# Patient Record
Sex: FEMALE | Race: BLACK OR AFRICAN AMERICAN | NOT HISPANIC OR LATINO | URBAN - METROPOLITAN AREA
[De-identification: names, ages, dates, MRNs, and addresses within clinical notes are randomized per-mention and may not be internally consistent; named-entity substitution may affect disease eponyms.]

---

## 2017-04-01 ENCOUNTER — OUTPATIENT (OUTPATIENT)
Dept: OUTPATIENT SERVICES | Facility: HOSPITAL | Age: 47
LOS: 1 days | End: 2017-04-01
Payer: MEDICAID

## 2017-04-01 DIAGNOSIS — Z98.89 OTHER SPECIFIED POSTPROCEDURAL STATES: Chronic | ICD-10-CM

## 2017-04-01 DIAGNOSIS — Z90.49 ACQUIRED ABSENCE OF OTHER SPECIFIED PARTS OF DIGESTIVE TRACT: Chronic | ICD-10-CM

## 2017-04-11 DIAGNOSIS — R69 ILLNESS, UNSPECIFIED: ICD-10-CM

## 2017-04-17 ENCOUNTER — OUTPATIENT (OUTPATIENT)
Dept: OUTPATIENT SERVICES | Facility: HOSPITAL | Age: 47
LOS: 1 days | End: 2017-04-17

## 2017-04-17 DIAGNOSIS — Z98.89 OTHER SPECIFIED POSTPROCEDURAL STATES: Chronic | ICD-10-CM

## 2017-04-17 DIAGNOSIS — Z90.49 ACQUIRED ABSENCE OF OTHER SPECIFIED PARTS OF DIGESTIVE TRACT: Chronic | ICD-10-CM

## 2017-06-01 PROCEDURE — G9001: CPT

## 2019-05-30 ENCOUNTER — EMERGENCY (EMERGENCY)
Facility: HOSPITAL | Age: 49
LOS: 1 days | Discharge: HOME | End: 2019-05-30
Attending: EMERGENCY MEDICINE
Payer: MEDICAID

## 2019-05-30 VITALS
DIASTOLIC BLOOD PRESSURE: 88 MMHG | SYSTOLIC BLOOD PRESSURE: 145 MMHG | HEART RATE: 95 BPM | OXYGEN SATURATION: 99 % | RESPIRATION RATE: 20 BRPM | TEMPERATURE: 98 F

## 2019-05-30 DIAGNOSIS — Z98.89 OTHER SPECIFIED POSTPROCEDURAL STATES: Chronic | ICD-10-CM

## 2019-05-30 DIAGNOSIS — Z90.49 ACQUIRED ABSENCE OF OTHER SPECIFIED PARTS OF DIGESTIVE TRACT: Chronic | ICD-10-CM

## 2019-05-30 LAB
ANION GAP SERPL CALC-SCNC: 13 MMOL/L — SIGNIFICANT CHANGE UP (ref 5–17)
APAP SERPL-MCNC: <15 UG/ML — SIGNIFICANT CHANGE UP (ref 10–30)
APPEARANCE UR: CLEAR — SIGNIFICANT CHANGE UP
BASOPHILS # BLD AUTO: 0.1 K/UL — SIGNIFICANT CHANGE UP (ref 0–0.2)
BASOPHILS NFR BLD AUTO: 1.2 % — SIGNIFICANT CHANGE UP (ref 0–2)
BILIRUB UR-MCNC: NEGATIVE — SIGNIFICANT CHANGE UP
BUN SERPL-MCNC: 8 MG/DL — SIGNIFICANT CHANGE UP (ref 7–23)
CALCIUM SERPL-MCNC: 9.4 MG/DL — SIGNIFICANT CHANGE UP (ref 8.4–10.5)
CHLORIDE SERPL-SCNC: 101 MMOL/L — SIGNIFICANT CHANGE UP (ref 96–108)
CO2 SERPL-SCNC: 26 MMOL/L — SIGNIFICANT CHANGE UP (ref 22–31)
COLOR SPEC: SIGNIFICANT CHANGE UP
CREAT SERPL-MCNC: 0.65 MG/DL — SIGNIFICANT CHANGE UP (ref 0.5–1.3)
DIFF PNL FLD: NEGATIVE — SIGNIFICANT CHANGE UP
EOSINOPHIL # BLD AUTO: 0.2 K/UL — SIGNIFICANT CHANGE UP (ref 0–0.5)
EOSINOPHIL NFR BLD AUTO: 3.3 % — SIGNIFICANT CHANGE UP (ref 0–6)
ETHANOL SERPL-MCNC: SIGNIFICANT CHANGE UP MG/DL (ref 0–10)
GLUCOSE SERPL-MCNC: 132 MG/DL — HIGH (ref 70–99)
GLUCOSE UR QL: NEGATIVE — SIGNIFICANT CHANGE UP
HCT VFR BLD CALC: 40.1 % — SIGNIFICANT CHANGE UP (ref 34.5–45)
HGB BLD-MCNC: 13.4 G/DL — SIGNIFICANT CHANGE UP (ref 11.5–15.5)
KETONES UR-MCNC: NEGATIVE — SIGNIFICANT CHANGE UP
LEUKOCYTE ESTERASE UR-ACNC: NEGATIVE — SIGNIFICANT CHANGE UP
LYMPHOCYTES # BLD AUTO: 2.7 K/UL — SIGNIFICANT CHANGE UP (ref 1–3.3)
LYMPHOCYTES # BLD AUTO: 40.8 % — SIGNIFICANT CHANGE UP (ref 13–44)
MCHC RBC-ENTMCNC: 30.8 PG — SIGNIFICANT CHANGE UP (ref 27–34)
MCHC RBC-ENTMCNC: 33.3 GM/DL — SIGNIFICANT CHANGE UP (ref 32–36)
MCV RBC AUTO: 92.3 FL — SIGNIFICANT CHANGE UP (ref 80–100)
MONOCYTES # BLD AUTO: 0.4 K/UL — SIGNIFICANT CHANGE UP (ref 0–0.9)
MONOCYTES NFR BLD AUTO: 5.6 % — SIGNIFICANT CHANGE UP (ref 2–14)
NEUTROPHILS # BLD AUTO: 3.3 K/UL — SIGNIFICANT CHANGE UP (ref 1.8–7.4)
NEUTROPHILS NFR BLD AUTO: 49.1 % — SIGNIFICANT CHANGE UP (ref 43–77)
NITRITE UR-MCNC: NEGATIVE — SIGNIFICANT CHANGE UP
PCP SPEC-MCNC: SIGNIFICANT CHANGE UP
PH UR: 7.5 — SIGNIFICANT CHANGE UP (ref 5–8)
PLATELET # BLD AUTO: 230 K/UL — SIGNIFICANT CHANGE UP (ref 150–400)
POTASSIUM SERPL-MCNC: 3.6 MMOL/L — SIGNIFICANT CHANGE UP (ref 3.5–5.3)
POTASSIUM SERPL-SCNC: 3.6 MMOL/L — SIGNIFICANT CHANGE UP (ref 3.5–5.3)
PROT UR-MCNC: SIGNIFICANT CHANGE UP
RBC # BLD: 4.35 M/UL — SIGNIFICANT CHANGE UP (ref 3.8–5.2)
RBC # FLD: 13.5 % — SIGNIFICANT CHANGE UP (ref 10.3–14.5)
SALICYLATES SERPL-MCNC: <2 MG/DL — LOW (ref 15–30)
SODIUM SERPL-SCNC: 140 MMOL/L — SIGNIFICANT CHANGE UP (ref 135–145)
SP GR SPEC: 1.02 — SIGNIFICANT CHANGE UP (ref 1.01–1.02)
UROBILINOGEN FLD QL: NEGATIVE — SIGNIFICANT CHANGE UP
WBC # BLD: 6.7 K/UL — SIGNIFICANT CHANGE UP (ref 3.8–10.5)
WBC # FLD AUTO: 6.7 K/UL — SIGNIFICANT CHANGE UP (ref 3.8–10.5)

## 2019-05-30 PROCEDURE — 99285 EMERGENCY DEPT VISIT HI MDM: CPT | Mod: 25

## 2019-05-30 PROCEDURE — 93005 ELECTROCARDIOGRAM TRACING: CPT

## 2019-05-30 PROCEDURE — 85027 COMPLETE CBC AUTOMATED: CPT

## 2019-05-30 PROCEDURE — 81003 URINALYSIS AUTO W/O SCOPE: CPT

## 2019-05-30 PROCEDURE — 93010 ELECTROCARDIOGRAM REPORT: CPT

## 2019-05-30 PROCEDURE — 99285 EMERGENCY DEPT VISIT HI MDM: CPT

## 2019-05-30 PROCEDURE — 80048 BASIC METABOLIC PNL TOTAL CA: CPT

## 2019-05-30 PROCEDURE — 80307 DRUG TEST PRSMV CHEM ANLYZR: CPT

## 2019-05-30 NOTE — ED PROVIDER NOTE - CLINICAL SUMMARY MEDICAL DECISION MAKING FREE TEXT BOX
Attending MD Blair: 48F with chronic pain presenting for evaluation of possible SI. Patient was filling out a questionnaire at her doctor's office and stated "yes" to thoughts of wanting to drive into a body of water. No active SI, patient with linear thought process and forward thinking. Will attempt to discuss with patient's primary psychologist for collateral, no acute medical issues. If unable to discuss with patient's psychologist, will discuss with our psych team.

## 2019-05-30 NOTE — ED PROVIDER NOTE - NSFOLLOWUPINSTRUCTIONS_ED_ALL_ED_FT
-Follow-up with your psychologist or primary care provider in 2 day.  -You may go to the [facility] as needed for mental health treatment Monday-Friday 9AM-5PM for walk-in treatment.  -To locate a psychiatrist you may call your insurance company or the [facility] Hotline [phone] ([phone])  -You may return to the Emergency Department at any time for any concerns as needed.

## 2019-05-30 NOTE — ED PROVIDER NOTE - ATTENDING CONTRIBUTION TO CARE
Attending MD Blair:  I personally have seen and examined this patient.  Resident note reviewed and agree on plan of care and except where noted.  See HPI, PE, and MDM for details.

## 2019-05-30 NOTE — ED PROVIDER NOTE - NSFOLLOWUPCLINICS_GEN_ALL_ED_FT
[facility]  Behavioral Health  [address] [place] [address]  Phone: [phone]  Fax:   Follow Up Time: Routine

## 2019-05-30 NOTE — ED PROVIDER NOTE - PROGRESS NOTE DETAILS
Sign out follow-up: Medically clear. Psychiatry clears for outpt f/u with her psychologist. Psychiatry f/u as needed.

## 2019-05-30 NOTE — ED PROVIDER NOTE - OBJECTIVE STATEMENT
49 yo female with PMH of spinal cord surgery, morbid obesity, presents to the ED for suicidal ideation BIBA. Pt was at a new pain management doctor appointment today, filled out the survey for new patients and indicated "yes" on the questionnaire to suicidal ideation. Pt states she is not actively suicidal, just that her life is stressful due to ill parents, chronic pain, unemployed 2/2 spinal cord injury and chronic pain following surgery years ago. Pt A&O x3, clinically sober. Denies other complaints at this time. Denies HI, hallucinations. Indicates no previous suicide attempts. States she has felt depressed for about 5 years. Pt sees a psychologist for therapy once a week, states it does not help much.     Endorses passive SI and thoughts of "jumping off a building" or "jumping into water." States she feels her problems would be over if she committed suicide and that she could "no longer fail at taking care of my children or parents if I was dead."    Therapist: Dr. Goldberg [phone]

## 2019-05-30 NOTE — ED PROVIDER NOTE - CARE PLAN
Principal Discharge DX:	Adjustment disorder with anxiety  Secondary Diagnosis:	Chronic pain syndrome

## 2019-05-30 NOTE — ED ADULT NURSE NOTE - OBJECTIVE STATEMENT
48 year old female brought in by ems from doctor's office for suicidal ideations. PMH of chronic pain secondary to arthritis, HTN, DM, obesity. As per EMS, patient went to outpatient neurologist and filled out paperwork at new neurologist appointment that she was at for disability paperwork for being out of work (since 2009). Patient states questionnaire she was filling out asked if she ever felt suicidal and she checked yes, but does not have a plan to harm herself, never has, and has no homicidal ideations. Patient states she does not feel like she needs hospitalization and wants to go home. VSS. Patient is calm and cooperative. A&Ox3. Security called to collect belongings and wand patient. Patient in CC 29.

## 2019-05-31 VITALS
DIASTOLIC BLOOD PRESSURE: 89 MMHG | HEART RATE: 84 BPM | SYSTOLIC BLOOD PRESSURE: 166 MMHG | RESPIRATION RATE: 16 BRPM | OXYGEN SATURATION: 98 % | TEMPERATURE: 98 F

## 2019-05-31 DIAGNOSIS — F43.22 ADJUSTMENT DISORDER WITH ANXIETY: ICD-10-CM

## 2019-05-31 DIAGNOSIS — G89.4 CHRONIC PAIN SYNDROME: ICD-10-CM

## 2019-05-31 PROCEDURE — 90792 PSYCH DIAG EVAL W/MED SRVCS: CPT | Mod: GT

## 2019-05-31 RX ORDER — IBUPROFEN 200 MG
600 TABLET ORAL ONCE
Refills: 0 | Status: DISCONTINUED | OUTPATIENT
Start: 2019-05-31 | End: 2019-06-03

## 2019-05-31 NOTE — ED ADULT NURSE REASSESSMENT NOTE - NS ED NURSE REASSESS COMMENT FT1
Patient is resting comfortable in room with son at bedside. Patient is calm and cooperative. Comfort and safety maintained with 1:1. Patient had telepsych call. MD aware.
Patient is resting in room with son at bedside. Pt. is awaiting results of labs and is aware of plan of care and possible DC home. Patient has no complaints at this time. Patient is calm and cooperative. Safety and comfort maintained.

## 2019-05-31 NOTE — ED BEHAVIORAL HEALTH ASSESSMENT NOTE - RISK ASSESSMENT
Acute Suicide Risk  (  ) High   (  ) Moderate   (x  ) Low   (  ) Unable to determine   Rationale __denies suicidal ideation _________    Elevated Chronic Risk   (  ) Yes ___________  Details ___________  (x  ) No   _____no hx of suicide attempts, hospitalizations, or severe psychiatric illness ______

## 2019-05-31 NOTE — ED BEHAVIORAL HEALTH ASSESSMENT NOTE - DESCRIPTION
Patient brought to ED by EMS activated by doctor at 19:44: telepsychiatry consulted at 23:43. Patient presented with good  hygiene. Patient reported at triage that her doctor sent her in for passive suicidal ideation. Primary nurse reports that patient stated that the survey answer did not warrant a visit to ED. Patient was cooperative with triage, patient provided blood and urine specimens willingly. Patient remains in clothing, described to be wearing stretchy pants and t-shirt, and allowed for security check without incident. Per primary nurse patient presents with euthymic mood and full affect. Speech is noted to be at normal rate and volume. Patient is noted observed to be alert and oriented x4. Patient is logical and linear, denying SI/HI/AH/VH. Provider noted patient “endorsed passive SI and thoughts of "jumping off a building" or "jumping into water." States she feels her problems would be over if she committed suicide and that she could "no longer fail at taking care of my children or parents if I was dead." Per primary nurse patient denied SI and reported never having a plan. Per primary nurse patient is able to maintain eye contact and engaged appropriately with staff. Patient is noted to be in good behavioral control, not requiring security or medication intervention. Patient was given turkey sandwich earlier in the evening. Patient is accompanied at bedside by son Patient brought to ED by EMS activated by doctor at 19:44: telepsychiatry consulted at 23:43. Patient presented with good  hygiene. Patient reported at triage that her doctor sent her in for passive suicidal ideation. Primary nurse reports that patient stated that the survey answer did not warrant a visit to ED. Patient was cooperative with triage, patient provided blood and urine specimens willingly. Patient remains in clothing, described to be wearing stretchy pants and t-shirt, and allowed for security check without incident. Per primary nurse patient presents with euthymic mood and full affect. Speech is noted to be at normal rate and volume. Patient is noted observed to be alert and oriented x4. Patient is logical and linear, denying SI/HI/AH/VH. Provider noted patient “endorsed passive SI and thoughts of "jumping off a building" or "jumping into water." States she feels her problems would be over if she committed suicide and that she could "no longer fail at taking care of my children or parents if I was dead." Per primary nurse patient denied SI and reported never having a plan. Per primary nurse patient is able to maintain eye contact and engaged appropriately with staff. Patient is noted to be in good behavioral control, not requiring security or medication intervention. Patient was given turkey sandwich earlier in the evening. Patient is accompanied at bedside by son. see hpi Back pain, spinal stenosis, s/p spinal surgery

## 2019-05-31 NOTE — ED BEHAVIORAL HEALTH NOTE - BEHAVIORAL HEALTH NOTE
Consult requested by EM Attending Dr. Blair  at 23:43 Telepsychiatry attempted to consult at 00:00 but unable to initiate due to no private room being available. ED staff educated to notify Telepsychiatry once private room available.

## 2019-05-31 NOTE — ED BEHAVIORAL HEALTH ASSESSMENT NOTE - HPI (INCLUDE ILLNESS QUALITY, SEVERITY, DURATION, TIMING, CONTEXT, MODIFYING FACTORS, ASSOCIATED SIGNS AND SYMPTOMS)
Patient is 47 y/o; ; black; female; domiciled in private residence with  and 5 of 6 children; on disability; noncaregiver, has 6 adult children; no PPHx; no known Past psychiatric hospitalizations ; no known  prior suicidality; no known prior violence; no known prior arrests ; no known substance use; no known prior withdrawals; PMHx spinal cord surgery and chronic pain; brought in by EMS; called by doctor; presenting with reported passive SI; in the setting of completing a mental health survey at new provider’s office.   Patient is calm and cooperative. She states she was at her doctors to complete her disability process, when she was seen by a new NP, and filled out questionnaire, answering honestly if she had ever had thoughts of suicide from time to time, when all of a sudden police materialized and took her, and brought her to ED. she states she answered the question affirmatively ("have you ever thought about suicide?") but does not currently have, nor has she ever had, thoughts , intentions or plans of committing suicide. she describes a hx of back pain, spinal stenosis and spinal surgery in 2015, which has led her to be unable to work (had worked for [employer]), and in a wheelchair at times (sometimes, she loses function of legs, at other times she is able to ambulate independently). she reports as a result of not being able to work (and not as of yet collecting disability), she has incurred debt and has financial stressors, which are the chief source of her irritation, anxiety and depression. she also has an elderly mother with cancer and CHF, and   Per EMS Run sheet  UNIT 46G2 DISPATCHED TO A REPORTED EDP - EDP - PSYCHIATRIC PATIENT.  48 YEAR OLD FEMALE FOUND SITTING DOWN   UPON ARRIVAL, UNIT FOUND A 49 Y/O FEMALE SITTING ON EXAM CHAIR IN DOCTORS OFFICE REFUSING MEDICAL ATTENTION AND TRANSPORTATION. AS PER PHYSICIAN ON SCENE, [facility] PT CAME IN FOR NEUROLOGY FOLLOW UP REGARDING PAST HEAD TRAUMA AND FILLED OUT A QUESTIONAIRE THAT STATED PT WANTED TO HARM HERSELF. AS PER PT, STATES QUESTIONAIRE WAS ANSWERED UNHONESTLY AND WISHES TO REFUSE TREATMENT AND TRANSPORTATION TO A HOSPITAL FOR A PSYCH EVALUATION. PT STATES SHE SUFFERS FROM OVERWHELMING STRESS FROM HOME BUT DOES HAVE A PSYCH HISTORY AND DENIES SUICIDAL IDEATIONS, AND HOMICIDAL IDEATIONS WHEN ASKED. CONDITIONS BOSS 49 CALLED ON SCENE AND TELEMETRY CONTACTED FOR RMA. RMA DENIED BY ONLINE MEDICAL PHYSICIAN [id number] AND PT TRANSPORTED TO [facility] AS PER TELEMETRY APPROVAL WOI/WOC.  Per collateral (son), patient is at her baseline. Reports that patient is overall a happy person without psychiatric complaints. Chart notes therapist, collateral is unaware that patient is in treatment. Patient reports that patient has chronic pain and is on disability. Collateral reports patient was at new provider appointment today and she answered a question regarding suicidality and was sent to the hospital; collateral denies all history of suicidality.  Patient has not had any past psychiatric visits, hospitalizations, medication trials or visits with a therapist or a counselor. No hx of suicidality, suicidal attempts or self- injurious behavior in the past. Collateral denies any psychiatric symptoms including depressed mood, anhedonia, changes in energy/concentration/appetite, sleep disturbances, feelings of guilt, elevated mood, increased irritability, mood lability, distractibility, grandiosity, pressured speech, increase in goal-directed activity, decreased need for sleep, paranoia, ideas of reference, thought insertion/broadcasting, or auditory/visual/olfactory/tactile/gustatory hallucinations. Collateral denies verbalization of suicidality and homicidality. Denies history of violence and aggression. Denies trauma history. Denies substance use. Denies safety concerns, reports patient is safe to return home and is not in need of outpatient follow up. Patient is 47 y/o; ; black; female; domiciled in private residence with  and 5 of 6 children; on disability; noncaregiver, has 6 adult children; no PPHx; no known Past psychiatric hospitalizations ; no known  prior suicidality; no known prior violence; no known prior arrests ; no known substance use; no known prior withdrawals; PMHx spinal cord surgery and chronic pain; brought in by EMS; called by doctor; presenting with reported passive SI; in the setting of completing a mental health survey at new provider’s office.   Patient is calm and cooperative. She states she was at her doctors to complete her disability process, when she was seen by a new NP, and filled out questionnaire, answering honestly if she had ever had thoughts of suicide from time to time, when all of a sudden police materialized and took her, and brought her to ED. she states she answered the question affirmatively ("have you ever thought about suicide?") but does not currently have, nor has she ever had, thoughts , intentions or plans of committing suicide. she describes a hx of back pain, spinal stenosis and spinal surgery in 2015, which has led her to be unable to work (had worked for [employer]), and in a wheelchair at times (sometimes, she loses function of legs, at other times she is able to ambulate independently). she reports as a result of not being able to work (and not as of yet collecting disability), she has incurred debt and has financial stressors, which are the chief source of her irritation, anxiety and depression. she also has an elderly mother with cancer and CHF, and a father with dementia, and two of her daughters have asthma, and all these stressors make her overwhelmed at times and stressed out. she describes her mood as "irritated," but denies sx's of depression. she states however her back pain causes her problems sleeping and to have at times sad mood. denies hopelessness/guilt/ suicidal ideation. denies manic /psychotic sx's, denies subst use. is future oriented. she sees a therapist, a Dr. Goldberg, as part of her treatment for chronic pain related issues and anxiety and disability process. does not and has never seen a psychiatrist.   Per EMS Run sheet  UNIT 46G2 DISPATCHED TO A REPORTED EDP - EDP - PSYCHIATRIC PATIENT.  48 YEAR OLD FEMALE FOUND SITTING DOWN   UPON ARRIVAL, UNIT FOUND A 47 Y/O FEMALE SITTING ON EXAM CHAIR IN DOCTORS OFFICE REFUSING MEDICAL ATTENTION AND TRANSPORTATION. AS PER PHYSICIAN ON SCENE, STATES PT CAME IN FOR NEUROLOGY FOLLOW UP REGARDING PAST HEAD TRAUMA AND FILLED OUT A QUESTIONAIRE THAT STATED PT WANTED TO HARM HERSELF. AS PER PT, STATES QUESTIONAIRE WAS ANSWERED UNHONESTLY AND WISHES TO REFUSE TREATMENT AND TRANSPORTATION TO A HOSPITAL FOR A PSYCH EVALUATION. PT STATES SHE SUFFERS FROM OVERWHELMING STRESS FROM HOME BUT DOES HAVE A PSYCH HISTORY AND DENIES SUICIDAL IDEATIONS, AND HOMICIDAL IDEATIONS WHEN ASKED. CONDITIONS BOSS 49 CALLED ON SCENE AND TELEMETRY CONTACTED FOR RMA. RMA DENIED BY [facility] MEDICAL PHYSICIAN [id number] AND PT TRANSPORTED TO [facility] AS PER TELEMETRY APPROVAL WOI/WOC.  Per collateral (son), patient is at her baseline. Reports that patient is overall a happy person without psychiatric complaints. Chart notes therapist, collateral is unaware that patient is in treatment. Patient reports that patient has chronic pain and is on disability. Collateral reports patient was at new provider appointment today and she answered a question regarding suicidality and was sent to the hospital; collateral denies all history of suicidality.  Patient has not had any past psychiatric visits, hospitalizations, medication trials or visits with a therapist or a counselor. No hx of suicidality, suicidal attempts or self- injurious behavior in the past. Collateral denies any psychiatric symptoms including depressed mood, anhedonia, changes in energy/concentration/appetite, sleep disturbances, feelings of guilt, elevated mood, increased irritability, mood lability, distractibility, grandiosity, pressured speech, increase in goal-directed activity, decreased need for sleep, paranoia, ideas of reference, thought insertion/broadcasting, or auditory/visual/olfactory/tactile/gustatory hallucinations. Collateral denies verbalization of suicidality and homicidality. Denies history of violence and aggression. Denies trauma history. Denies substance use. Denies safety concerns, reports patient is safe to return home and is not in need of outpatient follow up.

## 2019-05-31 NOTE — ED BEHAVIORAL HEALTH ASSESSMENT NOTE - SUMMARY
47 y/o; ; black; female; domiciled in private residence with  and 5 of 6 children; on disability; noncaregiver, has 6 adult children; no PPHx; no known Past psychiatric hospitalizations ; no known  prior suicidality; no known prior violence; no known prior arrests ; no known substance use; no known prior withdrawals; PMHx spinal cord surgery and chronic pain; brought in by EMS; called by doctor; presenting with reported passive SI; in the setting of completing a mental health survey at new provider’s office.   Patient answered a historical question regarding suicidality in the affirmative on a screening tool, however she currently denies any suicidal ideation intent or plan; while she reports difficulties and anxiety associated with medical and financial/life stressors, she denies broadly psychiatric sxs; collateral from son offers no safety concerns. pt is not suicidal, not an acute danger or in need of hospitalization and is fit for discharge, to follow up with her therapist; referrals and resources to psychiatric care will be provided to pt as well.

## 2020-01-22 ENCOUNTER — APPOINTMENT (OUTPATIENT)
Dept: NEUROLOGY | Facility: CLINIC | Age: 50
End: 2020-01-22

## 2021-10-14 ENCOUNTER — APPOINTMENT (OUTPATIENT)
Dept: GASTROENTEROLOGY | Facility: CLINIC | Age: 51
End: 2021-10-14
Payer: MEDICAID

## 2021-10-14 VITALS — BODY MASS INDEX: 53.92 KG/M2 | HEIGHT: 62 IN | WEIGHT: 293 LBS

## 2021-10-14 DIAGNOSIS — Z86.010 PERSONAL HISTORY OF COLONIC POLYPS: ICD-10-CM

## 2021-10-14 DIAGNOSIS — R19.7 DIARRHEA, UNSPECIFIED: ICD-10-CM

## 2021-10-14 PROCEDURE — 99204 OFFICE O/P NEW MOD 45 MIN: CPT

## 2021-10-14 RX ORDER — CHOLESTYRAMINE 4 G/9G
4 POWDER, FOR SUSPENSION ORAL DAILY
Qty: 30 | Refills: 3 | Status: ACTIVE | COMMUNITY
Start: 2021-10-14 | End: 1900-01-01

## 2021-10-14 NOTE — PHYSICAL EXAM
[FreeTextEntry1] : obese [Sclera] : the sclera and conjunctiva were normal [PERRL With Normal Accommodation] : pupils were equal in size, round, and reactive to light [Extraocular Movements] : extraocular movements were intact [Outer Ear] : the ears and nose were normal in appearance [Oropharynx] : the oropharynx was normal [Neck Appearance] : the appearance of the neck was normal [Neck Cervical Mass (___cm)] : no neck mass was observed [Jugular Venous Distention Increased] : there was no jugular-venous distention [Thyroid Diffuse Enlargement] : the thyroid was not enlarged [Thyroid Nodule] : there were no palpable thyroid nodules [] : no respiratory distress [Auscultation Breath Sounds / Voice Sounds] : lungs were clear to auscultation bilaterally [Obese] : obese [Normal] : normal [Soft, Nontender] : the abdomen was soft and nontender [Epigastric] : in the epigastric area [RUQ] : in the right upper quadrant [RLQ] : in the right lower quadrant [LUQ] : not in the LUQ [LLQ] : not in the LLQ [Firm] : not firm [Rigid] : not rigid [Rebound] : no rebound [Guarding] : no guarding [No Mass] : no masses were palpated [No HSM] : no hepatosplenomegaly noted [Cervical Lymph Nodes Enlarged Posterior Bilaterally] : posterior cervical [Cervical Lymph Nodes Enlarged Anterior Bilaterally] : anterior cervical [Supraclavicular Lymph Nodes Enlarged Bilaterally] : supraclavicular [Axillary Lymph Nodes Enlarged Bilaterally] : axillary [Femoral Lymph Nodes Enlarged Bilaterally] : femoral [Inguinal Lymph Nodes Enlarged Bilaterally] : inguinal [No CVA Tenderness] : no ~M costovertebral angle tenderness [No Spinal Tenderness] : no spinal tenderness [Abnormal Walk] : normal gait [Nail Clubbing] : no clubbing  or cyanosis of the fingernails [Musculoskeletal - Swelling] : no joint swelling seen [Motor Tone] : muscle strength and tone were normal [Oriented To Time, Place, And Person] : oriented to person, place, and time [Impaired Insight] : insight and judgment were intact [Affect] : the affect was normal

## 2021-10-14 NOTE — HISTORY OF PRESENT ILLNESS
[de-identified] : Obese\par ? Hx Polyps years ago - refusing colon\par GERD\par Bloat \par Diarrhea Post Choly

## 2021-10-26 ENCOUNTER — APPOINTMENT (OUTPATIENT)
Dept: ORTHOPEDIC SURGERY | Facility: CLINIC | Age: 51
End: 2021-10-26
Payer: MEDICAID

## 2021-10-26 VITALS — HEIGHT: 62 IN | BODY MASS INDEX: 53.92 KG/M2 | WEIGHT: 293 LBS

## 2021-10-26 DIAGNOSIS — M17.10 UNILATERAL PRIMARY OSTEOARTHRITIS, UNSPECIFIED KNEE: ICD-10-CM

## 2021-10-26 PROCEDURE — 99203 OFFICE O/P NEW LOW 30 MIN: CPT

## 2021-10-26 PROCEDURE — 73560 X-RAY EXAM OF KNEE 1 OR 2: CPT | Mod: 50

## 2021-10-26 NOTE — HISTORY OF PRESENT ILLNESS
[FreeTextEntry1] : This is the first visit of a 51 years old female with morbid obesity presented with a chief complaint of bilateral knee pain

## 2021-10-26 NOTE — PHYSICAL EXAM
[FreeTextEntry1] : Physical exam revealed a morbid obesity patient fully alert oriented examination of both knees demonstrate full range of motion no swelling no palpable mass no gross neurovascular deficit new plain x-rays of both knees demonstrate degenerative osteoarthritis with some displacement of the knee joint.  At this point and patient was recommended to be followed and to be seen as needed the general orthopedic surgeon

## 2022-01-06 NOTE — ED PROVIDER NOTE - CARDIOVASCULAR NEGATIVE STATEMENT, MLM
Detail Level: Generalized Continue Regimen: Sunscreen SPF 30 or higher and re-apply every 2 hours in the sun. Continue Regimen: Vanicream soaps and moisturizers. no chest pain and no edema.

## 2022-01-31 ENCOUNTER — APPOINTMENT (OUTPATIENT)
Dept: ORTHOPEDIC SURGERY | Facility: CLINIC | Age: 52
End: 2022-01-31
Payer: MEDICAID

## 2022-01-31 VITALS
WEIGHT: 293 LBS | OXYGEN SATURATION: 98 % | HEART RATE: 88 BPM | HEIGHT: 62 IN | DIASTOLIC BLOOD PRESSURE: 100 MMHG | SYSTOLIC BLOOD PRESSURE: 167 MMHG | BODY MASS INDEX: 53.92 KG/M2

## 2022-01-31 DIAGNOSIS — M75.41 IMPINGEMENT SYNDROME OF RIGHT SHOULDER: ICD-10-CM

## 2022-01-31 DIAGNOSIS — M19.071 PRIMARY OSTEOARTHRITIS, RIGHT ANKLE AND FOOT: ICD-10-CM

## 2022-01-31 DIAGNOSIS — M25.511 PAIN IN RIGHT SHOULDER: ICD-10-CM

## 2022-01-31 DIAGNOSIS — M17.0 BILATERAL PRIMARY OSTEOARTHRITIS OF KNEE: ICD-10-CM

## 2022-01-31 DIAGNOSIS — M19.072 PRIMARY OSTEOARTHRITIS, RIGHT ANKLE AND FOOT: ICD-10-CM

## 2022-01-31 PROCEDURE — 99214 OFFICE O/P EST MOD 30 MIN: CPT

## 2022-01-31 PROCEDURE — 73610 X-RAY EXAM OF ANKLE: CPT | Mod: LT,RT

## 2022-01-31 PROCEDURE — 73564 X-RAY EXAM KNEE 4 OR MORE: CPT | Mod: LT,RT

## 2022-09-19 ENCOUNTER — RESULT REVIEW (OUTPATIENT)
Age: 52
End: 2022-09-19

## 2023-03-09 ENCOUNTER — APPOINTMENT (OUTPATIENT)
Dept: GASTROENTEROLOGY | Facility: CLINIC | Age: 53
End: 2023-03-09
Payer: MEDICAID

## 2023-03-09 VITALS
SYSTOLIC BLOOD PRESSURE: 153 MMHG | DIASTOLIC BLOOD PRESSURE: 86 MMHG | OXYGEN SATURATION: 99 % | HEIGHT: 62 IN | TEMPERATURE: 97.2 F | RESPIRATION RATE: 17 BRPM | HEART RATE: 72 BPM

## 2023-03-09 VITALS — WEIGHT: 293 LBS | HEIGHT: 62 IN | BODY MASS INDEX: 53.92 KG/M2

## 2023-03-09 DIAGNOSIS — Z12.11 ENCOUNTER FOR SCREENING FOR MALIGNANT NEOPLASM OF COLON: ICD-10-CM

## 2023-03-09 DIAGNOSIS — R11.0 NAUSEA: ICD-10-CM

## 2023-03-09 DIAGNOSIS — K21.9 GASTRO-ESOPHAGEAL REFLUX DISEASE W/OUT ESOPHAGITIS: ICD-10-CM

## 2023-03-09 PROCEDURE — 99214 OFFICE O/P EST MOD 30 MIN: CPT

## 2023-03-09 RX ORDER — PANTOPRAZOLE 40 MG/1
40 TABLET, DELAYED RELEASE ORAL DAILY
Qty: 1 | Refills: 3 | Status: ACTIVE | COMMUNITY
Start: 2023-03-09 | End: 1900-01-01

## 2023-03-09 NOTE — HISTORY OF PRESENT ILLNESS
[FreeTextEntry1] : Obese\par ? Hx Polyps years ago - refusing colon ,\par  patient was instructed in 2021 to do a cologuard but did not complete \par GERD and is not currently on medication she refuses \par Bloat \par nausea is  intermittently usually worse when she is experiencing her period. \par

## 2023-03-09 NOTE — PHYSICAL EXAM

## 2023-03-09 NOTE — ASSESSMENT
[FreeTextEntry1] : A low acid / reflux diet was discussed in great detail including not smoking, not drinking alcohol, and not\par consuming foods that irritate the esophagus. It is helpful to eat small meals throughout the day instead\par of large meals. You should avoid eating before bedtime or lying down after you eat. It can be helpful to\par raise the head of your bed six inches. Additionally, you should maintain a healthy weight and good\par posture.. The patient was given written material to take home and review. \par \par Cologuard ordered instructions provided to patient how to obtain sample and where to return specimen \par \par Patient strongly advised to consider diagnostic colonoscopy. Patient adamantly expressed that Cologuard is the only \par test that he will do for screening. Educated patient that Cologuard is of limited use for routine polyps. Despite discussion in great length of risks and benefits. Continues to refuse the screening colonoscopy\par \par Patient instructed to make a consultation appointment with Bariatric Surgeon for weight loss \par \par Patient verbalized understanding of all information provided. All questions answered and reviewed \par \par I spent 35 minutes with the patient as well as reviewing documents prior to and after the office visit\par \par \par \par

## 2023-07-11 ENCOUNTER — APPOINTMENT (OUTPATIENT)
Dept: NEUROLOGY | Facility: CLINIC | Age: 53
End: 2023-07-11
Payer: MEDICAID

## 2023-07-11 DIAGNOSIS — R53.1 WEAKNESS: ICD-10-CM

## 2023-07-11 DIAGNOSIS — Z00.00 ENCOUNTER FOR GENERAL ADULT MEDICAL EXAMINATION W/OUT ABNORMAL FINDINGS: ICD-10-CM

## 2023-07-11 PROCEDURE — 99205 OFFICE O/P NEW HI 60 MIN: CPT

## 2023-07-11 RX ORDER — HYDROCHLOROTHIAZIDE 12.5 MG/1
CAPSULE ORAL
Refills: 0 | Status: ACTIVE | COMMUNITY

## 2023-07-11 RX ORDER — IBUPROFEN 100 MG
TABLET,CHEWABLE ORAL
Refills: 0 | Status: ACTIVE | COMMUNITY

## 2023-07-11 NOTE — HISTORY OF PRESENT ILLNESS
[FreeTextEntry1] : 52-year-old woman who is here for initial consultation of muscle weakness after C-spine surgery in 2015.  Patient states that she has had chronic arm weakness where it is difficult to comb her hair and is difficult for her to go up and down the stairs.  Patient also has dry eyes but no change in sweat pattern.  Patient notices muscle twitching all over her body and she cannot stand for prolonged period of time.  Patient has been using a rolling walker since 2015.  Patient has seen neurologists and neurosurgeons.  Her last MRI of the lumbar spine was in 2015 which showed some mild disc herniation at L5-S1.  Patient is status post epidural injections by pain management which has not helped.  Patient also has lightheadedness at times of unknown cause.\par \par There is no family history of myopathy and patient is on supplementation with vitamin D along with other supplements.

## 2023-07-11 NOTE — PHYSICAL EXAM
[General Appearance - Alert] : alert [Oriented To Time, Place, And Person] : oriented to person, place, and time [Person] : oriented to person [Place] : oriented to place [Time] : oriented to time [Short Term Intact] : short term memory intact [Fluency] : fluency intact [Current Events] : adequate knowledge of current events [Cranial Nerves Optic (II)] : visual acuity intact bilaterally,  visual fields full to confrontation, pupils equal round and reactive to light [Cranial Nerves Oculomotor (III)] : extraocular motion intact [Cranial Nerves Trigeminal (V)] : facial sensation intact symmetrically [Cranial Nerves Facial (VII)] : face symmetrical [Cranial Nerves Vestibulocochlear (VIII)] : hearing was intact bilaterally [Cranial Nerves Accessory (XI - Cranial And Spinal)] : head turning and shoulder shrug symmetric [Cranial Nerves Hypoglossal (XII)] : there was no tongue deviation with protrusion [Motor Tone] : muscle tone was normal in all four extremities [Sensation Tactile Decrease] : light touch was intact [Coordination - Dysmetria Impaired Finger-to-Nose Bilateral] : not present [1+] : Biceps left 1+ [0] : Patella left 0 [FreeTextEntry5] : No fasciculations noted on exam [FreeTextEntry6] : No scapular winging, no atrophy noted.  Strength is 5 out of 5 in the deltoids, biceps 5 out of 5, handgrip 5 out of 5.  Pain limited left iliopsoas testing.  Right iliopsoas was 5 out of 5.  Bilateral TA and gastroc were 5 out of 5.  Foot plantarflexion dorsiflexion on the right side was 5 out of 5.  Pain limited on the left leg where she has a brace on her left ankle because of pain in her foot. [FreeTextEntry8] : Needs arm assistance to stand from seated position.  Waddling gait noted.  This is complicated by antalgic gait from musculoskeletal pain.

## 2023-07-11 NOTE — DATA REVIEWED
[de-identified] : MRI of the lumbar spine from 2015 which shows slight herniated disc in the L4-5 region.  Patient was advised to forward all previous records to my office.

## 2023-07-11 NOTE — DISCUSSION/SUMMARY
[FreeTextEntry1] : 52-year-old woman who is here for initial consultation of chronic muscle weakness.  We will check myopathy blood work.  Patient will return for nerve conduction EMG studies to evaluate for any signs of large fiber neuropathy versus lumbar radiculopathy.  Patient will also obtain an MRI of the lumbar spine to evaluate for any left L5-S1 radiculopathy.\par \par I spent the time noted on the day of this patient encounter preparing for, providing and documenting the above E/M service and counseling and educate patient on differential, workup, disease course, and treatment/management. Education was provided to the patient during this encounter. All questions and concerns were answered and addressed in detail. The patient verbalized understanding and agreed to plan. Patient was advised to continue to monitor for neurologic symptoms and to notify my office or go to the nearest emergency room if there are any changes. Any orders/referrals and communications were provided as well. \par Side effects of the above medications were discussed in detail including but not limited to applicable black box warning and teratogenicity as appropriate. \par Patient was advised to bring previous records to my office. \par \par \par

## 2023-07-24 ENCOUNTER — APPOINTMENT (OUTPATIENT)
Dept: MRI IMAGING | Facility: CLINIC | Age: 53
End: 2023-07-24
Payer: MEDICAID

## 2023-07-24 ENCOUNTER — OUTPATIENT (OUTPATIENT)
Dept: OUTPATIENT SERVICES | Facility: HOSPITAL | Age: 53
LOS: 1 days | End: 2023-07-24
Payer: MEDICAID

## 2023-07-24 DIAGNOSIS — Z98.89 OTHER SPECIFIED POSTPROCEDURAL STATES: Chronic | ICD-10-CM

## 2023-07-24 DIAGNOSIS — Z90.49 ACQUIRED ABSENCE OF OTHER SPECIFIED PARTS OF DIGESTIVE TRACT: Chronic | ICD-10-CM

## 2023-07-24 DIAGNOSIS — Z00.8 ENCOUNTER FOR OTHER GENERAL EXAMINATION: ICD-10-CM

## 2023-07-24 PROCEDURE — 72148 MRI LUMBAR SPINE W/O DYE: CPT

## 2023-07-24 PROCEDURE — 72148 MRI LUMBAR SPINE W/O DYE: CPT | Mod: 26

## 2023-07-27 ENCOUNTER — NON-APPOINTMENT (OUTPATIENT)
Age: 53
End: 2023-07-27

## 2023-07-27 RX ORDER — DULOXETINE HYDROCHLORIDE 60 MG/1
60 CAPSULE, DELAYED RELEASE PELLETS ORAL
Qty: 1 | Refills: 3 | Status: ACTIVE | COMMUNITY
Start: 2023-07-27 | End: 1900-01-01

## 2023-08-09 ENCOUNTER — APPOINTMENT (OUTPATIENT)
Dept: NEUROLOGY | Facility: CLINIC | Age: 53
End: 2023-08-09
Payer: MEDICAID

## 2023-08-09 PROCEDURE — 95886 MUSC TEST DONE W/N TEST COMP: CPT

## 2023-08-09 PROCEDURE — 95910 NRV CNDJ TEST 7-8 STUDIES: CPT

## 2023-09-05 LAB
A-TOCOPHEROL VIT E SERPL-MCNC: 11 MG/L
ALDOLASE SERPL-CCNC: 5.1 U/L
ALT SERPL-CCNC: 10 U/L
ANA SER IF-ACNC: NEGATIVE
ANION GAP SERPL CALC-SCNC: 15 MMOL/L
AST SERPL-CCNC: 17 U/L
BETA+GAMMA TOCOPHEROL SERPL-MCNC: 1 MG/L
BUN SERPL-MCNC: 6 MG/DL
CALCIUM SERPL-MCNC: 9.3 MG/DL
CHLORIDE SERPL-SCNC: 104 MMOL/L
CK SERPL-CCNC: 114 U/L
CO2 SERPL-SCNC: 24 MMOL/L
CREAT SERPL-MCNC: 0.68 MG/DL
CRP SERPL-MCNC: 19 MG/L
EGFR: 105 ML/MIN/1.73M2
ERYTHROCYTE [SEDIMENTATION RATE] IN BLOOD BY WESTERGREN METHOD: 72 MM/HR
GLUCOSE SERPL-MCNC: 102 MG/DL
HMGCR ANTIBODY, IGG: <3 UNITS
LACTATE BLDA-MCNC: 1.4 MMOL/L
LDH SERPL-CCNC: 238 U/L
M PROTEIN SPEC IFE-MCNC: NORMAL
POTASSIUM SERPL-SCNC: 4.1 MMOL/L
SODIUM SERPL-SCNC: 144 MMOL/L
T4 SERPL-MCNC: 7.5 UG/DL
TSH SERPL-ACNC: 2.04 UIU/ML

## 2023-09-12 LAB — MYOGLOBIN UR-MCNC: <2 NG/ML

## 2023-09-18 LAB
EJ AB SER QL: NEGATIVE
ENA JO1 AB SER IA-ACNC: <20 UNITS
ENA PM/SCL AB SER-ACNC: <20 UNITS
ENA SM+RNP AB SER IA-ACNC: <20 UNITS
ENA SS-A IGG SER QL: <20 UNITS
FIBRILLARIN AB SER QL: NEGATIVE
KU AB SER QL: NEGATIVE
MDA-5 (P140)(CADM-140): <20 UNITS
MI2 AB SER QL: NEGATIVE
NXP-2 (P140): <20 UNITS
OJ AB SER QL: NEGATIVE
PL12 AB SER QL: NEGATIVE
PL7 AB SER QL: NEGATIVE
SRP AB SERPL QL: NEGATIVE
TIF GAMMA (P155/140): <20 UNITS
U2 SNRNP AB SER QL: NEGATIVE

## 2023-09-26 ENCOUNTER — APPOINTMENT (OUTPATIENT)
Dept: NEUROLOGY | Facility: CLINIC | Age: 53
End: 2023-09-26
Payer: MEDICAID

## 2023-09-26 VITALS — BODY MASS INDEX: 53.92 KG/M2 | WEIGHT: 293 LBS | HEIGHT: 62 IN

## 2023-09-26 PROCEDURE — 99215 OFFICE O/P EST HI 40 MIN: CPT

## 2023-10-16 ENCOUNTER — APPOINTMENT (OUTPATIENT)
Dept: ORTHOPEDIC SURGERY | Facility: CLINIC | Age: 53
End: 2023-10-16
Payer: MEDICAID

## 2023-10-16 ENCOUNTER — NON-APPOINTMENT (OUTPATIENT)
Age: 53
End: 2023-10-16

## 2023-10-16 VITALS — BODY MASS INDEX: 53.92 KG/M2 | WEIGHT: 293 LBS | HEIGHT: 62 IN

## 2023-10-16 DIAGNOSIS — M72.2 PLANTAR FASCIAL FIBROMATOSIS: ICD-10-CM

## 2023-10-16 PROCEDURE — 99203 OFFICE O/P NEW LOW 30 MIN: CPT

## 2023-10-17 ENCOUNTER — APPOINTMENT (OUTPATIENT)
Dept: NEUROLOGY | Facility: CLINIC | Age: 53
End: 2023-10-17
Payer: MEDICAID

## 2023-10-17 DIAGNOSIS — M54.16 RADICULOPATHY, LUMBAR REGION: ICD-10-CM

## 2023-10-17 DIAGNOSIS — M35.3 POLYMYALGIA RHEUMATICA: ICD-10-CM

## 2023-10-17 DIAGNOSIS — M54.50 LOW BACK PAIN, UNSPECIFIED: ICD-10-CM

## 2023-10-17 DIAGNOSIS — G89.29 LOW BACK PAIN, UNSPECIFIED: ICD-10-CM

## 2023-10-17 PROCEDURE — 99214 OFFICE O/P EST MOD 30 MIN: CPT | Mod: 95

## 2023-10-30 ENCOUNTER — APPOINTMENT (OUTPATIENT)
Dept: ORTHOPEDIC SURGERY | Facility: CLINIC | Age: 53
End: 2023-10-30

## 2023-11-13 ENCOUNTER — APPOINTMENT (OUTPATIENT)
Dept: ORTHOPEDIC SURGERY | Facility: CLINIC | Age: 53
End: 2023-11-13

## 2023-12-19 ENCOUNTER — APPOINTMENT (OUTPATIENT)
Dept: RHEUMATOLOGY | Facility: CLINIC | Age: 53
End: 2023-12-19

## 2024-01-29 ENCOUNTER — APPOINTMENT (OUTPATIENT)
Dept: NEUROLOGY | Facility: CLINIC | Age: 54
End: 2024-01-29

## 2024-07-02 ENCOUNTER — APPOINTMENT (OUTPATIENT)
Dept: NEUROLOGY | Facility: CLINIC | Age: 54
End: 2024-07-02
Payer: MEDICAID

## 2024-07-02 DIAGNOSIS — R42 DIZZINESS AND GIDDINESS: ICD-10-CM

## 2024-07-02 PROCEDURE — 99214 OFFICE O/P EST MOD 30 MIN: CPT

## 2024-09-01 ENCOUNTER — NON-APPOINTMENT (OUTPATIENT)
Age: 54
End: 2024-09-01

## 2024-12-09 ENCOUNTER — APPOINTMENT (OUTPATIENT)
Dept: NEUROLOGY | Facility: CLINIC | Age: 54
End: 2024-12-09

## 2025-02-05 ENCOUNTER — APPOINTMENT (OUTPATIENT)
Dept: GASTROENTEROLOGY | Facility: CLINIC | Age: 55
End: 2025-02-05
Payer: MEDICAID

## 2025-02-05 VITALS
OXYGEN SATURATION: 100 % | HEIGHT: 62 IN | TEMPERATURE: 97.2 F | DIASTOLIC BLOOD PRESSURE: 127 MMHG | HEART RATE: 88 BPM | RESPIRATION RATE: 17 BRPM | SYSTOLIC BLOOD PRESSURE: 153 MMHG

## 2025-02-05 DIAGNOSIS — R10.13 EPIGASTRIC PAIN: ICD-10-CM

## 2025-02-05 DIAGNOSIS — Z12.11 ENCOUNTER FOR SCREENING FOR MALIGNANT NEOPLASM OF COLON: ICD-10-CM

## 2025-02-05 DIAGNOSIS — Z86.0100 PERSONAL HISTORY OF COLON POLYPS, UNSPECIFIED: ICD-10-CM

## 2025-02-05 DIAGNOSIS — K21.9 GASTRO-ESOPHAGEAL REFLUX DISEASE W/OUT ESOPHAGITIS: ICD-10-CM

## 2025-02-05 PROCEDURE — G2211 COMPLEX E/M VISIT ADD ON: CPT | Mod: NC

## 2025-02-05 PROCEDURE — 99214 OFFICE O/P EST MOD 30 MIN: CPT

## 2025-02-05 RX ORDER — OMEPRAZOLE 40 MG/1
40 CAPSULE, DELAYED RELEASE ORAL
Qty: 90 | Refills: 5 | Status: ACTIVE | COMMUNITY
Start: 2025-02-05 | End: 1900-01-01

## 2025-04-01 ENCOUNTER — APPOINTMENT (OUTPATIENT)
Dept: NEUROLOGY | Facility: CLINIC | Age: 55
End: 2025-04-01